# Patient Record
Sex: FEMALE | Race: BLACK OR AFRICAN AMERICAN | Employment: FULL TIME | URBAN - METROPOLITAN AREA
[De-identification: names, ages, dates, MRNs, and addresses within clinical notes are randomized per-mention and may not be internally consistent; named-entity substitution may affect disease eponyms.]

---

## 2017-02-16 ENCOUNTER — OFFICE VISIT (OUTPATIENT)
Dept: FAMILY MEDICINE CLINIC | Age: 37
End: 2017-02-16

## 2017-02-16 VITALS
DIASTOLIC BLOOD PRESSURE: 88 MMHG | OXYGEN SATURATION: 98 % | SYSTOLIC BLOOD PRESSURE: 143 MMHG | HEIGHT: 67 IN | BODY MASS INDEX: 35 KG/M2 | TEMPERATURE: 97.9 F | WEIGHT: 223 LBS | RESPIRATION RATE: 20 BRPM | HEART RATE: 75 BPM

## 2017-02-16 DIAGNOSIS — Z00.00 ROUTINE PHYSICAL EXAMINATION: ICD-10-CM

## 2017-02-16 DIAGNOSIS — Z00.00 ROUTINE PHYSICAL EXAMINATION: Primary | ICD-10-CM

## 2017-02-16 NOTE — PROGRESS NOTES
Chronic Illness Visit    Today's Date:  2017   Patient's Name: Bessy Aponte   Patient's :  1980     History:     Chief Complaint   Patient presents with   [address]     pt presents to establish care  HX of Fibroids Need referral to OB/GYN        Bessy Aponte is a 43 y.o. female presenting for  Establishment of Care. Well Woman Exam    Mammogram:  unknown  Papsmear Hx: unknown will refer to local Ob/Gyn  Sexual Activity/STD Exposure: denies  Menses: regular monthly periods denies excessive cramping or bleeding. Last about 3-4 days. Colonscopy: none, denies history of colon disease  Daily Vitamins: none  Sun Exposure: moderate  Denies feelings of sadness or depression      History reviewed. No pertinent past medical history. History reviewed. No pertinent past surgical history. Social History     Social History    Marital status: LEGALLY      Spouse name: N/A    Number of children: N/A    Years of education: N/A     Social History Main Topics    Smoking status: Never Smoker    Smokeless tobacco: None    Alcohol use Yes    Drug use: No    Sexual activity: Yes     Partners: Male     Birth control/ protection: Condom     Other Topics Concern    None     Social History Narrative     Family History   Problem Relation Age of Onset    Hypertension Mother     Cancer Mother      Allergies   Allergen Reactions    Penicillins Unknown (comments)       Problem List:      Patient Active Problem List   Diagnosis Code    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 No current facility-administered medications for this visit.           Constitutional: negative for fevers, chills, sweats and fatigue  Respiratory: negative for cough, sputum or wheezing  Cardiovascular: negative for chest pain, chest pressure/discomfort, dyspnea  Gastrointestinal: negative for nausea, vomiting, diarrhea, constipation and abdominal pain  Musculoskeletal:negative for myalgias and arthralgias  Neurological: negative for headaches and dizziness  Behavioral/Psych: negative for anxiety and depression    Physical Assessment:   VS:    Visit Vitals    /88    Pulse 75    Temp 97.9 °F (36.6 °C) (Oral)    Resp 20    Ht 5' 7\" (1.702 m)    Wt 223 lb (101.2 kg)    LMP 01/30/2017    SpO2 98%    BMI 34.93 kg/m2       Lab Results   Component Value Date/Time    Sodium 138 03/17/2014 11:53 AM    Potassium 4.1 03/17/2014 11:53 AM    Chloride 103 03/17/2014 11:53 AM    CO2 23 03/17/2014 11:53 AM    Anion gap 12 03/17/2014 11:53 AM    Glucose 112 03/17/2014 11:53 AM    BUN 7 03/17/2014 11:53 AM    Creatinine 0.92 03/17/2014 11:53 AM    BUN/Creatinine ratio 8 03/17/2014 11:53 AM    GFR est AA >60 03/17/2014 11:53 AM    GFR est non-AA >60 03/17/2014 11:53 AM    Calcium 9.9 03/17/2014 11:53 AM       General:   Well-groomed, well-nourished, in no distress, pleasant, alert, appropriate and conversant. Mouth:  Good dentition, oropharynx WNL without membranes, exudates, petechiae or ulcers  Neck:   Neck supple, no swelling, mass or tenderness, no thyromegaly  Cardiovasc:   RRR, no MRG. Pulses 2+ and symmetric at distal extremities. Pulmonary:   Lungs clear bilaterally. Normal respiratory effort. Abdomen:   Abdomen soft, NT, ND, NAB. Extremities:   No edema, no TTP bilateral calves. LEs warm and well-perfused. Neuro:   Alert and oriented, no focal deficits. No facial asymmetry noted. Skin:    No rashes or jaundice  MSK:   Normal ROM, 5/5 muscle strength  Psych:  No pressured speech or abnormal thought content        Assessment/Plan & Orders:       1.  Routine physical examination        Orders Placed This Encounter    LIPID PANEL    METABOLIC PANEL, COMPREHENSIVE    VITAMIN D, 25 HYDROXY       Routine Physical  -CMP and Lipid panel  -will give information for local Ob/Gyn Dr. Peter Melgar to f/u on Fibroids    Follow up in 6-12 months    *Plan of care reviewed with patient. Patient in agreement with plan and expresses understanding. All questions answered and patient encouraged to call or RTO if further questions or concerns.     Sebastian Cole MD  Family Medicine  2/16/2017  12:32 PM

## 2017-02-16 NOTE — PATIENT INSTRUCTIONS

## 2017-04-25 ENCOUNTER — OFFICE VISIT (OUTPATIENT)
Dept: OBGYN CLINIC | Age: 37
End: 2017-04-25

## 2017-04-25 VITALS
HEIGHT: 67 IN | BODY MASS INDEX: 33.9 KG/M2 | WEIGHT: 216 LBS | HEART RATE: 80 BPM | SYSTOLIC BLOOD PRESSURE: 139 MMHG | DIASTOLIC BLOOD PRESSURE: 91 MMHG

## 2017-04-25 DIAGNOSIS — Z01.419 WELL FEMALE EXAM WITH ROUTINE GYNECOLOGICAL EXAM: Primary | ICD-10-CM

## 2017-04-25 PROBLEM — D21.9 FIBROIDS: Status: ACTIVE | Noted: 2017-04-25

## 2017-04-25 NOTE — PATIENT INSTRUCTIONS

## 2017-04-25 NOTE — PROGRESS NOTES
This is a 10-year-old female nulligravida who presents for a well woman examination. Her main GYN complaints is the fact that she has recurrent fibroid tumors in her uterus and she has heavy menstruation with clotting. Denies any severe pelvic pain or history of anemia. She desires childbearing and therefore is not interested in a hysterectomy, her genitourinary review of systems was otherwise negative. Past medical history was reviewed. Vital signs are stable. Well-developed well-nourished female in no apparent distress. HEENT exam revealed no thyromegaly. Breasts are soft with no palpable masses. Abdomen was soft but a palpable mass was felt 3 fingerbreadths from the umbilicus. External genitalia unremarkable. Vaginal canal was unremarkable. Cervix had no visible lesions. Uterus was approximately 16 weeks size and irregular. Adnexa unremarkable    Well woman exam  Symptomatic uterine fibroids    Pelvic ultrasound  Patient was discussed options such as myomectomy and fibroid embolization for treatment of her fibroids. Understands importance of diet and exercise. She will follow-up in a couple weeks to discuss results of her ultrasound.

## 2017-04-27 LAB
CHLAMYDIA TRACHOMATIS THINPREP, 13342: NEGATIVE
NEISSERIA GONORRHOEAE THINPREP, 13343: NEGATIVE
PAP IMAGE GUIDED, 8900296: NORMAL

## 2017-08-17 ENCOUNTER — OFFICE VISIT (OUTPATIENT)
Dept: FAMILY MEDICINE CLINIC | Age: 37
End: 2017-08-17

## 2017-08-17 VITALS
HEIGHT: 67 IN | SYSTOLIC BLOOD PRESSURE: 126 MMHG | BODY MASS INDEX: 32.49 KG/M2 | WEIGHT: 207 LBS | TEMPERATURE: 99 F | DIASTOLIC BLOOD PRESSURE: 79 MMHG | HEART RATE: 71 BPM | OXYGEN SATURATION: 100 % | RESPIRATION RATE: 18 BRPM

## 2017-08-17 DIAGNOSIS — Z20.2 POSSIBLE EXPOSURE TO STD: ICD-10-CM

## 2017-08-17 DIAGNOSIS — R03.0 ELEVATED BLOOD PRESSURE READING: ICD-10-CM

## 2017-08-17 DIAGNOSIS — Z13.220 SCREENING FOR LIPID DISORDERS: ICD-10-CM

## 2017-08-17 DIAGNOSIS — Z01.30 BLOOD PRESSURE CHECK: Primary | ICD-10-CM

## 2017-08-17 DIAGNOSIS — Z13.21 ENCOUNTER FOR VITAMIN DEFICIENCY SCREENING: ICD-10-CM

## 2017-08-17 NOTE — PROGRESS NOTES
Patient: Shilpa Bowie MRN: [id number]  SSN: [id number]    YOB: 1980  Age: 42 y.o. Sex: female      Date of Service: 8/17/2017   Provider: ALEXI Soriano Caro         REASON FOR VISIT:   Chief Complaint   Patient presents with    Follow-up     pt presents for follow up for HTN since loosing some weight \"        VITALS:   Visit Vitals    /79    Pulse 71    Temp 99 °F (37.2 °C) (Oral)    Resp 18    Ht 5' 7\" (1.702 m)    Wt 207 lb (93.9 kg)    LMP 08/11/2017    SpO2 100%    BMI 32.42 kg/m2       MEDICATIONS:   No current outpatient prescriptions on file prior to visit. No current facility-administered medications on file prior to visit. 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 No pertinent past medical history. History reviewed. No pertinent surgical history. FAMILY HISTORY:  Family History   Problem Relation Age of Onset    Hypertension Mother     Cancer Mother         SOCIAL HISTORY:  Social History   Substance Use Topics    Smoking status: Never Smoker    Smokeless tobacco: Never Used    Alcohol use Yes         HISTORY OF PRESENT ILLNESS: Shilpa Bowie is a 42 y.o. female who presents to the office for a routine follow up visit. Here today for blood pressure check. BP had been a bit elevated at her physical 6 months ago, patient was advised to work on diet and exercise. She has successfully lost about 15 lbs since that visit by following a plant based diet. Reports she is feeling great. She comes in today for reassessment. No complaints. She does express interest in STD screening. She is currently going through a divorce.  She is sexually active and using protection. Asymptomatic today. REVIEW OF SYSTEMS:  Review of Systems   Constitutional: Negative for chills and fever. Respiratory: Negative for cough, shortness of breath and wheezing. Cardiovascular: Negative for chest pain and palpitations. Gastrointestinal: Negative for abdominal pain, nausea and vomiting. Neurological: Negative for dizziness. PHYSICAL EXAMINATION:  Physical Exam   Constitutional: She is oriented to person, place, and time and well-developed, well-nourished, and in no distress. Cardiovascular: Normal rate, regular rhythm and normal heart sounds. Exam reveals no gallop and no friction rub. No murmur heard. Pulmonary/Chest: Effort normal and breath sounds normal. She has no wheezes. She has no rales. Neurological: She is alert and oriented to person, place, and time. Skin: Skin is warm and dry. Psychiatric: Mood, memory and affect normal.        RESULTS:  No results found for this visit on 08/17/17. ASSESSMENT/PLAN:  Diagnoses and all orders for this visit:    1. Blood pressure check  2. Elevated blood pressure reading  - BP much improved today  - Routine labs re-ordered, patient will go to the lab today   -     METABOLIC PANEL, COMPREHENSIVE  -     TSH 3RD GENERATION; Future    3. Screening for lipid disorders  -     LIPID PANEL; Future    4. Encounter for vitamin deficiency screening  -     VITAMIN D, 25 HYDROXY    5. Possible exposure to STD  -     HIV 1/2 AG/AB, 4TH GENERATION,W RFLX CONFIRM; Future  -     RPR; Future  -     HSV 1/2 AB, IGG/IGM; Future  -     CHLAMYDIA/NEISSERIA/TRICHOMONAS AMP; Future    STD screening and routine labs ordered today  Patient will follow up in 6 months for her annual physical, or sooner as needed    Patient expresses understanding and is agreeable with the above plan.       ALEXI English   August 17, 2017    1:31 PM

## 2017-08-17 NOTE — MR AVS SNAPSHOT
Visit Information Date & Time Provider Department Dept. Phone Encounter #  
 8/17/2017  1:00 PM MEGHANN English [facility] [phone] [id number] Upcoming Health Maintenance Date Due DTaP/Tdap/Td series (1 - Tdap) 5/4/1999 INFLUENZA AGE 9 TO ADULT 8/1/2017 PAP AKA CERVICAL CYTOLOGY 4/25/2020 Allergies as of 8/17/2017  Review Complete On: 8/17/2017 By: ALEXI English No Known Allergies Current Immunizations  Never Reviewed No immunizations on file. Not reviewed this visit You Were Diagnosed With   
  
 Codes Comments Blood pressure check    -  Primary ICD-10-CM: Z01.30 ICD-9-CM: V81.1 Elevated blood pressure reading     ICD-10-CM: R03.0 ICD-9-CM: 796.2 Screening for lipid disorders     ICD-10-CM: Z13.220 ICD-9-CM: V77.91 Encounter for vitamin deficiency screening     ICD-10-CM: Z13.21 ICD-9-CM: V77.99 Possible exposure to STD     ICD-10-CM: Z20.2 ICD-9-CM: V01.6 Vitals BP Pulse Temp Resp Height(growth percentile) Weight(growth percentile) 126/79 71 99 °F (37.2 °C) (Oral) 18 5' 7\" (1.702 m) 207 lb (93.9 kg) LMP SpO2 BMI OB Status Smoking Status 08/11/2017 100% 32.42 kg/m2 Having regular periods Never Smoker Vitals History BMI and BSA Data Body Mass Index Body Surface Area  
 32.42 kg/m 2 2.11 m 2 Your Updated Medication List  
  
Notice  As of 8/17/2017  1:26 PM  
 You have not been prescribed any medications. We Performed the Following METABOLIC PANEL, COMPREHENSIVE [60457 CPT(R)] VITAMIN D, 25 HYDROXY [id number] CPT(R)] To-Do List   
 08/17/2017 Lab:  CHLAMYDIA/NEISSERIA/TRICHOMONAS AMP   
  
 08/17/2017 Lab:  HIV 1/2 AG/AB, 4TH GENERATION,W RFLX CONFIRM   
  
 08/17/2017 Lab:  HSV 1/2 AB, IGG/IGM   
  
 08/17/2017 Lab:  RPR Around 08/18/2017 Lab:  LIPID PANEL Around 08/18/2017   Lab:  TSH 3RD GENERATION   
 Introducing [facility] & HEALTH SERVICES! [facility] introduces [employer] patient portal. Now you can access parts of your medical record, email your doctor's office, and request medication refills online. 1. In your internet browser, go to [email or web address] 2. Click on the First Time User? Click Here link in the Sign In box. You will see the New Member Sign Up page. 3. Enter your [employer] Access Code exactly as it appears below. You will not need to use this code after youve completed the sign-up process. If you do not sign up before the expiration date, you must request a new code. · [employer] Access Code: [email or web address] Expires: 11/15/2017  1:26 PM 
 
4. Enter the last four digits of your Social Security Number (xxxx) and Date of Birth (mm/dd/yyyy) as indicated and click Submit. You will be taken to the next sign-up page. 5. Create a [employer] ID. This will be your [employer] login ID and cannot be changed, so think of one that is secure and easy to remember. 6. Create a [employer] password. You can change your password at any time. 7. Enter your Password Reset Question and Answer. This can be used at a later time if you forget your password. 8. Enter your e-mail address. You will receive e-mail notification when new information is available in [address] 9. Click Sign Up. You can now view and download portions of your medical record. 10. Click the Download Summary menu link to download a portable copy of your medical information. If you have questions, please visit the Frequently Asked Questions section of the [employer] website. Remember, [employer] is NOT to be used for urgent needs. For medical emergencies, dial 911. Now available from your iPhone and Android! Please provide this summary of care documentation to your next provider. Your primary care clinician is listed as Denver Hawthorn. If you have any questions after today's visit, please call [phone].

## 2017-08-18 ENCOUNTER — HOSPITAL ENCOUNTER (OUTPATIENT)
Dept: LAB | Age: 37
Discharge: HOME | End: 2017-08-18

## 2017-08-18 DIAGNOSIS — Z13.220 SCREENING FOR LIPID DISORDERS: ICD-10-CM

## 2017-08-18 DIAGNOSIS — R03.0 ELEVATED BLOOD PRESSURE READING: ICD-10-CM

## 2017-08-18 LAB — SENTARA SPECIMEN COL,SENBCF: NORMAL

## 2017-08-18 PROCEDURE — 99001 SPECIMEN HANDLING PT-LAB: CPT | Performed by: PHYSICIAN ASSISTANT

## 2017-08-19 LAB
25(OH)D3 SERPL-MCNC: 17.9 NG/ML (ref 32–100)
A-G RATIO,AGRAT: 1.7 RATIO (ref 1.1–2.6)
ALBUMIN SERPL-MCNC: 4.2 G/DL (ref 3.5–5)
ALP SERPL-CCNC: 67 U/L (ref 25–115)
ALT SERPL-CCNC: 17 U/L (ref 5–40)
ANION GAP SERPL CALC-SCNC: 13 MMOL/L
AST SERPL W P-5'-P-CCNC: 22 U/L (ref 10–37)
BILIRUB SERPL-MCNC: 0.3 MG/DL (ref 0.2–1.2)
BUN SERPL-MCNC: 9 MG/DL (ref 6–22)
CALCIUM SERPL-MCNC: 9.1 MG/DL (ref 8.4–10.5)
CHLORIDE SERPL-SCNC: 102 MMOL/L (ref 98–110)
CHOLEST SERPL-MCNC: 150 MG/DL (ref 110–200)
CO2 SERPL-SCNC: 24 MMOL/L (ref 20–32)
CREAT SERPL-MCNC: 0.7 MG/DL (ref 0.5–1.2)
GFRAA, 66117: >60
GFRNA, 66118: >60
GLOBULIN,GLOB: 2.5 G/DL (ref 2–4)
GLUCOSE SERPL-MCNC: 85 MG/DL (ref 65–99)
HDLC SERPL-MCNC: 54 MG/DL (ref 40–59)
HIV -1/0/2 AG/AB WITH REFLEX, 13463: NON REACTIVE
HIV 1 & 2 AB SER-IMP: NORMAL
LDLC SERPL CALC-MCNC: 91 MG/DL (ref 50–99)
POTASSIUM SERPL-SCNC: 5 MMOL/L (ref 3.5–5.5)
PROT SERPL-MCNC: 6.7 G/DL (ref 6.4–8.3)
SODIUM SERPL-SCNC: 139 MMOL/L (ref 133–145)
TRIGL SERPL-MCNC: 30 MG/DL (ref 40–149)
TSH SERPL DL<=0.005 MIU/L-ACNC: 0.77 MCU/ML (ref 0.27–4.2)
VLDLC SERPL CALC-MCNC: 6 MG/DL (ref 8–30)

## 2017-08-20 LAB
HSV 2 AB,IGG, HS2G: >8 AI
HSV1 IGG SER QL: 8 AI
SYPHILIS (T. PALLIDUM) IGG, 15809: NON REACTIVE

## 2017-08-21 LAB
CHLAMYDIA AMPLIFIED URINE: NEGATIVE
GC AMPLIFIED URINE,919: NEGATIVE
TRICH NUCU, 17621: NEGATIVE

## 2017-08-22 LAB — HSV IGM I/II COMBINATION AB: 1.31 RATIO

## 2017-10-19 NOTE — MR AVS SNAPSHOT
Cautery Type: electrodesiccation Visit Information Date & Time Provider Department Dept. Phone Encounter #  
 2/16/2017 12:30 PM Pravin Evans MD [facility] [phone] [id number] Follow-up Instructions Return in about 1 year (around 2/16/2018). Your Appointments 8/17/2017  1:00 PM  
FOLLOW UP EXAM with Pravin Evans MD  
[facility] [address]) Appt Note: 6 month f/u  
 [address] [facility] [id number]  
[facility] [id number] Upcoming Health Maintenance Date Due DTaP/Tdap/Td series (1 - Tdap) 5/4/1999 PAP AKA CERVICAL CYTOLOGY 8/25/2014 Allergies as of 2/16/2017  Review Complete On: 2/16/2017 By: Pravin Evans MD  
  
 Severity Noted Reaction Type Reactions Penicillins  03/17/2014    Unknown (comments) Current Immunizations  Never Reviewed No immunizations on file. Not reviewed this visit You Were Diagnosed With   
  
 Codes Comments Routine physical examination    -  Primary ICD-10-CM: Z00.00 ICD-9-CM: V70.0 Vitals BP Pulse Temp Resp Height(growth percentile) Weight(growth percentile) 143/88 75 97.9 °F (36.6 °C) (Oral) 20 5' 7\" (1.702 m) 223 lb (101.2 kg) LMP SpO2 BMI OB Status Smoking Status 01/30/2017 98% 34.93 kg/m2 Having regular periods Never Smoker Vitals History BMI and BSA Data Body Mass Index Body Surface Area 34.93 kg/m 2 2.19 m 2 Your Updated Medication List  
  
Notice  As of 2/16/2017  1:15 PM  
 You have not been prescribed any medications. Follow-up Instructions Return in about 1 year (around 2/16/2018). To-Do List   
 02/16/2017 Lab:  LIPID PANEL   
  
 02/16/2017 Lab:  METABOLIC PANEL, COMPREHENSIVE   
  
 02/16/2017 Lab:  VITAMIN D, 25 HYDROXY Patient Instructions Well Visit, Ages 25 to 48: Care Instructions Your Care Instructions Number Of Curettages: 3 Physical exams can help you stay healthy. Your doctor has checked your overall health and may have suggested ways to take good care of yourself. He or she also may have recommended tests. At home, you can help prevent illness with healthy eating, regular exercise, and other steps. Follow-up care is a key part of your treatment and safety. Be sure to make and go to all appointments, and call your doctor if you are having problems. It's also a good idea to know your test results and keep a list of the medicines you take. How can you care for yourself at home? · Reach and stay at a healthy weight. This will lower your risk for many problems, such as obesity, diabetes, heart disease, and high blood pressure. · Get at least 30 minutes of physical activity on most days of the week. Walking is a good choice. You also may want to do other activities, such as running, swimming, cycling, or playing tennis or team sports. Discuss any changes in your exercise program with your doctor. · Do not smoke or allow others to smoke around you. If you need help quitting, talk to your doctor about stop-smoking programs and medicines. These can increase your chances of quitting for good. · Talk to your doctor about whether you have any risk factors for sexually transmitted infections (STIs). Having one sex partner (who does not have STIs and does not have sex with anyone else) is a good way to avoid these infections. · Use birth control if you do not want to have children at this time. Talk with your doctor about the choices available and what might be best for you. · Protect your skin from too much sun. When you're outdoors from 10 a.m. to 4 p.m., stay in the shade or cover up with clothing and a hat with a wide brim. Wear sunglasses that block UV rays. Even when it's cloudy, put broad-spectrum sunscreen (SPF 30 or higher) on any exposed skin. · See a dentist one or two times a year for checkups and to have your teeth cleaned. Consent was obtained from the patient. The risks, benefits and alternatives to therapy were discussed in detail. Specifically, the risks of infection, scarring, bleeding, prolonged wound healing, nerve injury, incomplete removal, allergy to anesthesia and recurrence were addressed. Alternatives to [facility], such as: surgical removal and XRT were also discussed. Prior to the procedure, the treatment site was clearly identified and confirmed by the patient. All components of Universal Protocol/PAUSE Rule completed. · Wear a seat belt in the car. · Drink alcohol in moderation, if at all. That means no more than 2 drinks a day for men and 1 drink a day for women. Follow your doctor's advice about when to have certain tests. These tests can spot problems early. For everyone · Cholesterol. Have the fat (cholesterol) in your blood tested after age 21. Your doctor will tell you how often to have this done based on your age, family history, or other things that can increase your risk for heart disease. · Blood pressure. Have your blood pressure checked during a routine doctor visit. Your doctor will tell you how often to check your blood pressure based on your age, your blood pressure results, and other factors. · Vision. Talk with your doctor about how often to have a glaucoma test. 
· Diabetes. Ask your doctor whether you should have tests for diabetes. · Colon cancer. Have a test for colon cancer at age 48. You may have one of several tests. If you are younger than 48, you may need a test earlier if you have any risk factors. Risk factors include whether you already had a precancerous polyp removed from your colon or whether your parent, brother, sister, or child has had colon cancer. For women · Breast exam and mammogram. Talk to your doctor about when you should have a clinical breast exam and a mammogram. Medical experts differ on whether and how often women under 50 should have these tests. Your doctor can help you decide what is right for you. · Pap test and pelvic exam. Begin Pap tests at age 24. A Pap test is the best way to find cervical cancer. The test often is part of a pelvic exam. Ask how often to have this test. 
· Tests for sexually transmitted infections (STIs). Ask whether you should have tests for STIs. You may be at risk if you have sex with more than one person, especially if your partners do not wear condoms. For men · Tests for sexually transmitted infections (STIs).  Ask whether you should Size Of Lesion After Curettage: 1.2 have tests for STIs. You may be at risk if you have sex with more than one person, especially if you do not wear a condom. · Testicular cancer exam. Ask your doctor whether you should check your testicles regularly. · Prostate exam. Talk to your doctor about whether you should have a blood test (called a PSA test) for prostate cancer. Experts differ on whether and when men should have this test. Some experts suggest it if you are older than 39 and are -American or have a father or brother who got prostate cancer when he was younger than 72. When should you call for help? Watch closely for changes in your health, and be sure to contact your doctor if you have any problems or symptoms that concern you. Where can you learn more? Go to http://devin-carolina.info/. Enter P072 in the search box to learn more about \"Well Visit, Ages 25 to 48: Care Instructions. \" Current as of: July 19, 2016 Content Version: 11.1 © 3119-0795 [employer]. Care instructions adapted under license by [employer] (which disclaims liability or warranty for this information). If you have questions about a medical condition or this instruction, always ask your healthcare professional. [place] [address] any warranty or liability for your use of this information. Introducing [facility] & HEALTH SERVICES! [facility] introduces [employer] patient portal. Now you can access parts of your medical record, email your doctor's office, and request medication refills online. 1. In your internet browser, go to [email or web address] 2. Click on the First Time User? Click Here link in the Sign In box. You will see the New Member Sign Up page. 3. Enter your [employer] Access Code exactly as it appears below. You will not need to use this code after youve completed the sign-up process. If you do not sign up before the expiration date, you must request a new code. Post-Care Instructions: I reviewed with the patient in detail post-care instructions. Patient is to keep the area dry for 48 hours, and not to engage in any swimming until the area is healed. Should the patient develop any fevers, chills, bleeding, severe pain patient will contact the office immediately. · [employer] Access Code: [email or web address] Expires: 5/17/2017 12:15 PM 
 
4. Enter the last four digits of your Social Security Number (xxxx) and Date of Birth (mm/dd/yyyy) as indicated and click Submit. You will be taken to the next sign-up page. 5. Create a [employer] ID. This will be your [employer] login ID and cannot be changed, so think of one that is secure and easy to remember. 6. Create a [employer] password. You can change your password at any time. 7. Enter your Password Reset Question and Answer. This can be used at a later time if you forget your password. 8. Enter your e-mail address. You will receive e-mail notification when new information is available in [address] 9. Click Sign Up. You can now view and download portions of your medical record. 10. Click the Download Summary menu link to download a portable copy of your medical information. If you have questions, please visit the Frequently Asked Questions section of the [employer] website. Remember, [employer] is NOT to be used for urgent needs. For medical emergencies, dial 911. Now available from your iPhone and Android! Please provide this summary of care documentation to your next provider. Your primary care clinician is listed as Jere Capps. If you have any questions after today's visit, please call [phone]. Anesthesia Type: 1% lidocaine with epinephrine Render Post-Care Instructions In Note?: no Bill As A Line Item Or As Units: Line Item Additional Information: (Optional): The wound was cleaned, and a pressure dressing was applied. The patient received detailed post-op instructions. Anesthesia Volume In Cc: 0.3 Detail Level: Detailed What Was Performed First?: Curettage Size Of Lesion In Cm: 1